# Patient Record
Sex: FEMALE
[De-identification: names, ages, dates, MRNs, and addresses within clinical notes are randomized per-mention and may not be internally consistent; named-entity substitution may affect disease eponyms.]

---

## 2017-12-18 ENCOUNTER — HOSPITAL ENCOUNTER (OUTPATIENT)
Dept: HOSPITAL 80 - FSGY | Age: 27
Discharge: HOME | End: 2017-12-18
Attending: ORTHOPAEDIC SURGERY
Payer: COMMERCIAL

## 2017-12-18 VITALS
OXYGEN SATURATION: 95 % | SYSTOLIC BLOOD PRESSURE: 106 MMHG | RESPIRATION RATE: 16 BRPM | DIASTOLIC BLOOD PRESSURE: 75 MMHG

## 2017-12-18 VITALS — HEART RATE: 80 BPM

## 2017-12-18 VITALS — TEMPERATURE: 97.3 F

## 2017-12-18 DIAGNOSIS — T84.84XA: Primary | ICD-10-CM

## 2017-12-18 PROCEDURE — 0QP304Z REMOVAL OF INTERNAL FIXATION DEVICE FROM LEFT PELVIC BONE, OPEN APPROACH: ICD-10-PCS | Performed by: ORTHOPAEDIC SURGERY

## 2017-12-18 RX ADMIN — LIDOCAINE HYDROCHLORIDE PRN ML: 10 INJECTION, SOLUTION EPIDURAL; INFILTRATION; INTRACAUDAL; PERINEURAL at 07:14

## 2017-12-18 RX ADMIN — LIDOCAINE HYDROCHLORIDE PRN ML: 10 INJECTION, SOLUTION EPIDURAL; INFILTRATION; INTRACAUDAL; PERINEURAL at 06:26

## 2017-12-18 NOTE — PDGENHP
History and Physical





- Chief Complaint


LEFT HIP PAIN





- History of Present Illness


HISTORY OF PRESENT ILLNESS:


Afshanis a 27 y.o.~~~active female~who I have had the pleasure to consult on 

today. I have enjoyed meeting her. She~lives in [place], [place]. ~Afshanworks 

at a  as a coordinator. ~She~is ; she~has 3~children. ~Afhsan

enjoys running, walking, hiking and working out but not active now.





Lauren's left~hip pain started in June~2013, with no~recalled trauma or injury, 

and with no~previous complaints. Afshandoes not have~a known history of hip 

dysplasia. 





Presentation today is of anterior, groin left~hip pain. ~The hip does~wake her~

at night and does~click and catch on her. Sitting can be uncomfortable~for her. 

Afshandoes~report suffering from lower back pain episodes and she has 

scoliosis.





Afshanhas~participated in physical therapy for 1.5 years~and has not~tried 

other conservative measures including hip injections . She~has not~received 

sufficient symptomatic improvement.





Afshanhas~utilized medication for pain management, including NSAID and OTC 

acetaminophen. Afshanhas used medication for 3 years.





Afshanhas occasional clicking on the right.





Afshanunderstands that she~has a hip and pelvis problem which should be 

researched and wishes to get a better understanding of her~hip status, followed 

by an establishment of a treatment strategy, hoping sheIvettwould be able to get 

back to her~well being active life.














History:








Past medical history: ~


None which is relevant 





Relevant familial history: None which is relevant 





Past surgical history: 


Bladder Surgery in 2000





Afshandenies problematic issues with general anesthesia in the past.





I have reviewed, verified and agree with the past medical, surgical, family and 

social history.





Current Medications:Ivetthas a current medication list which includes the following 

prescription(s): ibuprofen and norgestimate-ethinyl estradiol.





ALLERGIES:Ivetthas No Known Allergies.














Objective:








Physical Examination:


Afshanis 5~feet 6~inches tall and weighs 155~Lbs. Afshanis AAO x3; she~is well

-nourished, in NAD. Skin is warm and dry. ~Breathing is non-labored. ~CV with 

RRR by pulse. Abdomen is soft, NTND. 


Currently, she~walks with a normal~gait.


Trendelenburg sign is negative~and proprioception is reduced, both~sides.


She~presents with moderate~signs of joint laxity. Beightons Score: 5


She~is fit looking. ~~


Lower spine examination is negative~for sciatic or femoral nerve irritation 

with negative~SLR &~femoral stretch tests. Range of motion of the spine is 

normal~for flexion, extension, and rotations, with no~associated pain. 


Strength, Sensation and pulses are normal - bilaterally


Ankles and knees exams are normal~and no~mal-alignment is evident. 


She~has no leg length discrepancy.


Thigh circumference is symmetric~with no evidence for muscle atrophy~on both~

sides.





Hip ROM (degrees):











 FL ER


 At 90~hip FL IR


 At 90~hip FL AB AD EX IR


 Neutral hip ER


 Neutral hip


 


R 115 75 35 40 15 10 55 50


 


L 110P 45 50 45 5 10 70 30 (Pain inhibition)








Specific hip and pelvis tests:











 Quadrant CARLTON Roll Add. Longus


 


R + + Negative Negative


 


L +++ +++ Negative +++














 Glut. Med ITB Pos. Imp


 


R Negative


 5/5 strength Negative


 5/5 strength Negative


 


L Negative


 5/5 strength Negative


 5/5 strength Negative








Squeeze test measured normal


Bony Symphysis pubis is pain free~to touch while concentric activity of the 

rectus abdominis, does not~produce pain at its insertion.


Ilio Psos specific tests are negative for pain during cycling for both hips~and 

remarkable for no snap.


HF has good strength, non-painful both hips.


Anterior~capsule tenderness on the left.


Greater trochanteric burse is pain free~on both hips.


Piriformis tests: FAIR is negative, with no~local signs of neuritis related to 

sciatic nerve.


SIJs examination is produces pain on left side~with normal~CARLTON in relation 

and local tenderness.


Hamstrings tests are negative~functional contraction and positive~tendinopathy 

the left hip~at insertion.





On a daily basis, the following percentages reflect Lauren's overall total pain:


Deep hip: 90%


Left SIJ: 10%





Imaging:


Radiology studies which I have personally reviewed, analyzed and measured are 

below:





XR: 


AP of the hip and pelvis: 


Performed in a good~technique 


Coccyx to pubic symphysis distance 3.3~cm.


Upright. Zero degrees.





Shenton Lines are interrupted on the left.~~~~~~~~~~~~~


No~Pathological signs are seen in the Symphysis Pubis. 


No~Pathological signs are seen at the Ischial tuberosity. ~





Specific measurements show: 














 NSA~ LCE Sourcil~Angle  Sharp's angle Lat.


 Cam Lat. Pincer C.Over~sign Head~Coverage % ATDmm


 


R 141 10 16 47 + - - 51 N


 


L 134 -8 27 56 + - - 46 N




















 Pos. wall sign ISS NAD ~~Dysplasia Comments


 


R Negative Negative 26.2~mm +++ 


 


L Negative Negative 25.7~mm +++ 


 


 Sclerosis Sup. Lat. OA Cysts Joint Space-WBZ Joint Space-Medial


 


R + Negative Negative 7.5~mm 5.4~mm


 


L + Negative Negative 7.5~mm 7.9~mm











X Table lateral: 


Anterior cam lesion is seen~on both hips.





Alpha Angle: ~


Right 71~degrees


Left 67~degrees





CT MEASUREMENTS:


Right hip:


Lateral center edge angle: 11 degrees


Equatorial acetabular version angle: 29 degrees anteverted.


Cranial acetabular version angle: 19 degrees anteverted.


Femoral neck shaft angle: 140 degrees.


Right femoral torsion measures 19 degrees.





Left hip:


Lateral center edge angle: 5 degrees


Equatorial acetabular version angle: 27 degrees anteverted.


Cranial acetabular version angle: 13 degrees anteverted.


Femoral neck shaft angle: 141 degrees


Left femoral torsion measures 48 degrees.








Impression and plan: 


Afshanis a 27 y.o.~active female~suffering from symptomatic left~hip pain due 

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 

impingement (BARBARA) Cam type,~causing significant disability to her~and altering 

her~sport and life activities.





She has similar radiographic features on the right side.





Physical examination, imaging, and her~story correspond with the diagnosis 

mentioned above.





I explained that hip dysplasia is a condition wherein the hip joint has 

excessive play~and instability due to a variety of factors, including the 

depth and adequacy of the socket, the orientation of the femur bone, and 

ligament laxity around the hip joint. Dysplasia ranges in severity from 

borderline to nolvia, with treatment options being specific to the specific 

nature of the problem. Left untreated, the instability in the hip joint can 

cause progressive tearing of the labrum and deterioration of the surface 

cartilage, ultimately resulting in progressive osteoarthritis of the hip. 





I explained that femoroacetabular impingement (BARBARA - Cam type) arises due to a 

bony or soft tissue conflict between the femur (ball) and acetabulum (socket) 

caused by an abnormality in the shape of the femoral head and neck. Over time, 

repetitive impingement can result in damage to the labrum and adjacent surface 

cartilage within the socket, ultimately giving rise to progressive 

osteoarthritis of the hip.





I explained that although a labral tear can be a source of pain, it is rarely 

the root of the problem and typically occurs secondary to an underlying 

abnormality in the shape and mechanics of the hip joint. 





I reviewed conservative treatment options for Dysplasia and BARBARA including 

activity modification to avoid positions of impingement or instability, 

physical therapy, non-steroidal anti-inflammatory medications, and various 

injections (corticosteroid and PRP) aimed at reducing inflammation in the hip 

joint or/and preventing dynamic instability and impingement. PRP injections may 

promote healing and reduce symptoms in certain cases but it will not repair 

chronically damaged tissue. Although these measures may help to buy time~and 

reduce current level of symptoms, they are not a definitive solution to the 

problem given the underlying abnormality in the shape of the hip joint.





Patients who have failed conservative management and continue to experience 

symptoms are candidates for definitive surgical treatment, which may consist of 

hip arthroscopy alone or in combination with more invasive bony realignment 

procedures of the hip socket and/or femur called periacetabular osteotomy (JANINE) 

or derotational femoral osteotomy (DFO). 





Hip arthroscopy typically includes treating the labrum with either repair or 

reconstruction of the torn labrum; as well as addressing the underlying 

abnormalities by restoring the normal shape to the hip joint. If the cartilage 

is damaged a Microfracture surgical procedure may also be necessary to help 

stimulate the growth of fibrocartilage. If a patient requires a labral 

reconstruction or a Microfracture, the initial rehabilitation from the surgery 

may take longer, but the long term results are typically favorable.





I reviewed the technical aspects of hip arthroscopy including risks, benefits, 

and expected course of recovery. Lauren~understands that hip arthroscopy is a 

minimally invasive outpatient procedure carried out through small incisions on 

the outer aspect of the hip joint. During surgery, the labral tear will be 

identified and either repaired or reconstructed~using bone anchors and suture 

material. Additionally, any excessive bone will be removed with a high-speed 

micky to reshape the hip joint and restore normal anatomy. Risks include 

infection, bleeding, injury to nearby nerves or vessels, stiffness, persistent 

pain, instability, venous thromboembolic disease, and traction related 

complications including temporary foot numbness. Rarely, revision surgery may 

be required to address these problems. Overall recovery takes approximately 4~ 

8~months depending on the extent of damage and degree of repair.


In the event that the labral tissue quality is inadequate for successful repair 

and healing, Lauren~understands that a labral reconstruction will be performed. 

This procedure entails placing a cadaver tissue graft within the hip joint and 

stabilizing it with bone anchors to build a new labrum. The overall recovery 

time for labral reconstruction is similar to that of labral repair, although 

the surgical procedure takes longer to perform.





I reviewed the technical aspects of periacetabular osteotomy (JANINE) including 

risks, benefits, and expected course of recovery. Afshanunderstands that JANINE 

is an inpatient procedure carried out through two medium sized incisions on the 

front and back of the hip joint. The hip socket is cut, realigned, and 

stabilized with 2  3 internal screws. Risks include infection, bleeding, 

injury to nearby nerves or vessels, stiffness, persistent pain, instability, 

failure of bony healing, implant related complications, and venous 

thromboembolic disease. Rarely, revision surgery may be required to address 

these problems. Risks, potential complications, side effects and recovery from 

surgical procedure were discussed in length. We explained how this surgery is 

an open procedure, and though patients tend to do well in the long-term, it 

involves significant pain in the first 2-4 weeks post-op and a rather lengthy 

rehab.~Overall recovery takes approximately 6  12~months depending on the 

extent of damage and degree of repair. Afshanunderstands that she~will undergo 

hip arthroscopy 1 week prior to the JANINE to address damage inside the hip joint.





Lauren~understands that hip arthroscopy and JANINE are two separate procedures 

that are best performed one week apart, with the arthroscopy commencing first 

to "tighten up" any pathology evident in the hip joint (labral repair, etc.) 

and the JANINE open procedure occurring 7-10 days later to realign the acetabulum.





Lauren~will review the info presented.





In order to obtain more detailed information regarding the alignment, 

orientation, and shape of the bony hip and pelvis I will order a CT scan to be 

performed. The results of the CT scan, including femoral torsion and acetabular 

version measured values and 3D images, will aid me in deciding on the best 

treatment strategy and surgical pre-planning.





In order to evaluate the integrity of the surface cartilage within the hip joint

, I will order a delayed gadolinium enhanced MRI of cartilage (dGEMRIC). This 

study will determine whetherAdrianis a good candidate for hip preservation 

surgery.





Afshanwill talk with our surgical scheduler about possible surgery dates.





Afshanis happy with this plan.





I have also supplied her~with handouts, outlining the expected surgical 

treatment and rehab involved.





I wishAdrianall the best,











~~





History Information





- Allergies/Home Medication List


Allergies/Adverse Reactions: 








No Known Allergies Allergy (Verified 05/11/17 11:09)


 





Home Medications: 








Norgestimate-Ethinyl Estradiol [Sprintec 28 Day Tablet] 1 each PO DAILY 04/28/ 17 [Last Taken 12/17/17]


Ibuprofen [Motrin (*)] 200 mg PO DAILY PRN 11/09/17 [Last Taken Unknown]





I have personally reviewed and updated: medical history





- Social History


Smoking Status: Never smoked





Review of Systems


Review of Systems: 








Physical Exam


Physical Exam: 

















Temp Pulse Resp BP Pulse Ox


 


 36.6 C   71   18   111/71   98 


 


 12/18/17 06:29  12/18/17 06:29  12/18/17 06:29  12/18/17 06:29  12/18/17 06:29

## 2017-12-18 NOTE — POSTANESTH
Post Anesthetic Evaluation


Respiratory Status: Normal, Stable


Level of Consciousness/Mental Status: Can Participate in Eval, Alert and 

Oriented, Unconscious


Pain Control: Adequate, Prn Tx Ordered


Nausea/Vomiting Control: Adequate, Prn Tx Ordered


Complications Possibly Related to Anesthesia: None Noted

## 2017-12-18 NOTE — PDANEPAE
ANE History of Present Illness





Hardware Left Hip





ANE Past Medical History





- Cardiovascular History


Hx Hypertension: No


Hx Arrhythmias: No


Hx Chest Pain: No


Hx Coronary Artery / Peripheral Vascular Disease: No


Hx CHF / Valvular Disease: No


Hx Palpitations: No





- Pulmonary History


Hx COPD: No


Hx Asthma/Reactive Airway Disease: No


Hx Recent Upper Respiratory Infection: No


Hx Oxygen in Use at Home: No


Hx Sleep Apnea: No


Sleep Apnea Screening Result - Last Documented: Negative





- Neurologic History


Hx Cerebrovascular Accident: No


Hx Seizures: No


Hx Dementia: No





- Endocrine History


Hx Diabetes: No





- Renal History


Hx Renal Disorders: Yes


Renal History Comment: hx of chronic uti's.  bladder surgery at 11 yo





- Liver History


Hx Hepatic Disorders: No





- Neurological & Psychiatric Hx


Hx Neurological and Psychiatric Disorders: No





- Cancer History


Hx Cancer: No





- Congenital Disorder History


Hx Congenital Disorders: Yes


Congenital History Comment: bilater hip issues





- GI History


Hx Gastrointestinal Disorders: No





- Other Health History


Other Health History: anemic.  wears glasses





- Chronic Pain History


Chronic Pain: Yes (bilateral hips)





- Surgical History


Prior Surgeries: 06/19/17 left hip scope with Cris-[place].  Bladder surgery @ 11 yo





ANE Review of Systems


Review of Systems: 








- Exercise capacity


METS (RN): 5 METS





ANE Patient History





- Allergies


Allergies/Adverse Reactions: 








No Known Allergies Allergy (Verified 05/11/17 11:09)


 








- Home Medications


Home Medications: 








Norgestimate-Ethinyl Estradiol [Sprintec 28 Day Tablet] 1 each PO DAILY 04/28/ 17 [Last Taken 12/17/17]


Ibuprofen [Motrin (*)] 200 mg PO DAILY PRN 11/09/17 [Last Taken Unknown]








- NPO status


NPO Since - Liquids (Date): 12/18/17


NPO Since - Liquids (Time): 03:30


NPO Since - Solids (Date): 12/17/17


NPO Since - Solids (Time): 19:00





- Smoking Hx


Smoking Status: Never smoked





- Family Anes Hx


Family Hx Anesthesia Complications: none





ANE Labs/Vital Signs





- Vital Signs


Blood Pressure: 111/71


Heart Rate: 71


Respiratory Rate: 18


O2 Sat (%): 98


Height: 167.64 cm


Weight: 68.039 kg





ANE Anesthesia Plan


Anesthesia Plan: MAC

## 2018-10-28 NOTE — PDGENHP
History and Physical





- Chief Complaint


RIGHT HIP PAIN/ LEFT HIP PAIN





- History of Present Illness


Diagnosis:





1.~Bilateral~Hip Dyplasia, 


2. Bilateral Femoroacetabular impingement (BARBARA) Cam type,~


3. Left excessive femoral ante-torsion (48 degrees)


4. History of LEFT Hip Scope/DFO





HISTORY OF PRESENT ILLNESS:


Afshanis a 28 y.o.~~~active female~who I have had the pleasure to consult on 

today. I have enjoyed meeting her.~Lupe~lives in [place], [place].~~Afshanworks 

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 ~Afshan

enjoys running, walking, hiking and working out but not active now.





Gregs~hip pain started in~June~2013, with~no~recalled trauma or injury, and 

with no~previous complaints. Afshandoes not have~a known history of hip 

dysplasia. 





Presentation today is of~anterior,~groin~bilateral~hip pain. ~The hip~does~wake 

her~at night and does~click and catch on her. Sitting~can be uncomfortable~for 

her.~Afshandoes~report suffering from lower back pain episodes and she has 

scoliosis.





Afshanhas~participated in physical therapy for 1.5 years~and has not~tried 

other conservative measures including hip injections~.~Lupe~has not~received 

sufficient symptomatic improvement.





Afshanhas~utilized medication for pain management, including NSAID and OTC 

acetaminophen.~Afshanhas used medication for 3~years.





Afshanhas occasional clicking on the right.





Afshanunderstands that she~has a hip and pelvis problem which should be 

researched and wishes to get a better understanding of her~hip status, followed 

by an establishment of a treatment strategy, hoping she~would be able to get 

back to her~well being active life.














History:








Past medical history:~~


None which is relevant~





Relevant familial history:~None which is relevant~





Past surgical history:~


Bladder Surgery in 2000





Afshandenies problematic issues with general anesthesia in the past.





I have reviewed, verified and agree with the past medical, surgical, family and 

social history.





Current Medications:~has a current medication list which includes the following 

prescription(s): ibuprofen and norgestimate-ethinyl estradiol.





ALLERGIES:~has No Known Allergies.














Objective:








Physical Examination:


Afshanis 5~feet 6~inches tall and weighs 155~Lbs. Afshanis AAO x3; she~is well

-nourished, in NAD. Skin is warm and dry. ~Breathing is non-labored. ~CV with 

RRR by pulse. Abdomen is soft, NTND. 


Currently,~she~walks with a normal~gait.


Trendelenburg sign is~negative~and proprioception is reduced,~both~sides.


She~presents with moderate~signs of joint laxity. Beightons Score:~5


She~is fit looking. ~~


Lower spine examination is~negative~for sciatic or femoral nerve irritation 

with negative~SLR &~femoral stretch tests. Range of motion of the spine is 

normal~for flexion, extension, and rotations, with no~associated pain. 


Strength, Sensation and pulses are~normal -~bilaterally


Ankles and knees exams are~normal~and no~mal-alignment is evident. 


She~has no leg length discrepancy.


Thigh circumference is~symmetric~with no evidence for muscle atrophy~on both~

sides.





Hip ROM (degrees):











 FL ER


 At 90~hip FL IR


 At 90~hip FL AB AD EX IR


 Neutral hip ER


 Neutral hip


 


R 115 75 35 40 15 10 55 50


 


L 110P 45 50 45 5 10 70 30~(Pain inhibition)








Specific hip and pelvis tests:











 Quadrant CARLTON Roll Add. Longus


 


R + + Negative Negative


 


L +++ +++ Negative +++














 Glut. Med ITB Pos. Imp


 


R Negative


 5/5 strength Negative


 5/5 strength Negative


 


L Negative


 5/5 strength Negative


 5/5 strength Negative








Squeeze test measured~normal


Bony Symphysis pubis is~pain free~to touch while concentric activity of the 

rectus abdominis, does not~produce pain at its insertion.


Ilio Psos specific tests are~negative for pain during cycling for~both hips~and 

remarkable for no snap.


HF has~good strength, non-painful~both hips.


Anterior~capsule tenderness on the left.


Greater trochanteric burse is~pain free~on both hips.


Piriformis tests: FAIR is~negative,~with no~local signs of neuritis related to 

sciatic nerve.


SIJs examination is~produces pain on~left side~with normal~CARLTON in relation 

and local tenderness.


Hamstrings tests are~negative~functional contraction and positive~tendinopathy 

the left hip~at insertion.





On a daily basis, the following percentages reflectCheri's overall total pain:


Deep hip:~90%


Left SIJ:~10%





Imaging:


Radiology studies which I have personally reviewed, analyzed and measured are 

below:





XR: 


AP of the hip and pelvis: 


Performed in a~good~technique 


Coccyx to pubic symphysis distance~3.3~cm.


Upright. Zero degrees.





Shenton Lines are~interrupted~on the left.~~~~~~~~~~~~~


No~Pathological signs are seen in the Symphysis Pubis. 


No~Pathological signs are seen at the Ischial tuberosity. ~





Specific measurements show: 














 NSA~ LCE Sourcil~Angle  Sharp's angle Lat.


 Cam Lat. Pincer C.Over~sign Head~Coverage % ATDmm


 


R 141 10 16 47 + - - 51 N


 


L 134 -8 27 56 + - - 46 N




















 Pos. wall sign ISS NAD ~~Dysplasia Comments


 


R Negative Negative 26.2~mm +++ 


 


L Negative Negative 25.7~mm +++ 


 


 Sclerosis Sup. Lat. OA Cysts Joint Space-WBZ Joint Space-Medial


 


R + Negative Negative 7.5~mm 5.4~mm


 


L + Negative Negative 7.5~mm 7.9~mm











X Table lateral: 


Anterior cam lesion is~seen~on both hips.





Alpha Angle: ~


Right~71~degrees


Left~67~degrees





CT~MEASUREMENTS:


Right hip:


Lateral center edge angle: 11 degrees


Equatorial acetabular version angle: 29 degrees anteverted.


Cranial acetabular version angle: 19 degrees anteverted.


Femoral neck shaft angle: 140 degrees.


Right femoral torsion measures 19 degrees.





Left hip:


Lateral center edge angle: 5 degrees


Equatorial acetabular version angle: 27 degrees anteverted.


Cranial acetabular version angle: 13 degrees anteverted.


Femoral neck shaft angle: 141 degrees


Left femoral torsion measures 48 degrees.








Impression and plan:Ivett Cavanaughis a 28 y.o.~active female~suffering from symptomatic bilateral~hip pain 

due to Hip Dyplasia~with symptomatic~labral tear and~Femoroacetabular 

impingement (BARBARA) Cam type,~causing significant disability to her~and altering 

her~sport and life activities.





She has similar radiographic features on the right side.





Physical examination, imaging, and~her~story correspond with the diagnosis 

mentioned above.





I explained that hip dysplasia is a condition wherein the hip joint has 

excessive play~and instability due to a variety of factors, including the 

depth and adequacy of the socket, the orientation of the femur bone, and 

ligament laxity around the hip joint. Dysplasia ranges in severity from 

borderline to nolvia, with treatment options being specific to the specific 

nature of the problem. Left untreated, the instability in the hip joint can 

cause progressive tearing of the labrum and deterioration of the surface 

cartilage, ultimately resulting in progressive osteoarthritis of the hip. 





I explained that femoroacetabular impingement (BARBARA - Cam type) arises due to a 

bony or soft tissue conflict between the femur (ball) and acetabulum (socket) 

caused by an abnormality in the shape of the femoral head and neck. Over time, 

repetitive impingement can result in damage to the labrum and adjacent surface 

cartilage within the socket, ultimately giving rise to progressive 

osteoarthritis of the hip.





I explained that although a labral tear can be a source of pain, it is rarely 

the root of the problem and typically occurs secondary to an underlying 

abnormality in the shape and mechanics of the hip joint. 





I reviewed conservative treatment options for Dysplasia and BARBARA including 

activity modification to avoid positions of impingement or instability, 

physical therapy, non-steroidal anti-inflammatory medications, and various 

injections (corticosteroid and PRP) aimed at reducing inflammation in the hip 

joint or/and preventing dynamic instability and impingement. PRP injections may 

promote healing and reduce symptoms in certain cases but it will not repair 

chronically damaged tissue. Although these measures may help to buy time~and 

reduce current level of symptoms, they are not a definitive solution to the 

problem given the underlying abnormality in the shape of the hip joint.





Patients who have failed conservative management and continue to experience 

symptoms are candidates for definitive surgical treatment, which may consist of 

hip arthroscopy alone or in combination with more invasive bony realignment 

procedures of the hip socket and/or femur called periacetabular osteotomy (JANINE) 

or derotational femoral osteotomy (DFO). 





Hip arthroscopy typically includes treating the labrum with either repair or 

reconstruction of the torn labrum; as well as addressing the underlying 

abnormalities by restoring the normal shape to the hip joint. If the cartilage 

is damaged a Microfracture surgical procedure may also be necessary to help 

stimulate the growth of fibrocartilage. If a patient requires a labral 

reconstruction or a Microfracture, the initial rehabilitation from the surgery 

may take longer, but the long term results are typically favorable.





I reviewed the technical aspects of hip arthroscopy including risks, benefits, 

and expected course of recovery.~Lauren~understands that hip arthroscopy is a 

minimally invasive outpatient procedure carried out through small incisions on 

the outer aspect of the hip joint. During surgery, the labral tear will be 

identified and either repaired or reconstructed~using bone anchors and suture 

material. Additionally, any excessive bone will be removed with a high-speed 

micky to reshape the hip joint and restore normal anatomy. Risks include 

infection, bleeding, injury to nearby nerves or vessels, stiffness, persistent 

pain, instability, venous thromboembolic disease, and traction related 

complications including temporary foot numbness. Rarely, revision surgery may 

be required to address these problems. Overall recovery takes approximately 4~ 

8~months depending on the extent of damage and degree of repair.


In the event that the labral tissue quality is inadequate for successful repair 

and healing,~Lauren~understands that a labral reconstruction will be performed. 

This procedure entails placing a cadaver tissue graft within the hip joint and 

stabilizing it with bone anchors to build a new labrum. The overall recovery 

time for labral reconstruction is similar to that of labral repair, although 

the surgical procedure takes longer to perform.





I reviewed the technical aspects of periacetabular osteotomy (JANINE) including 

risks, benefits, and expected course of recovery.~Lauren~understands that JANINE 

is an inpatient procedure carried out through two medium sized incisions on the 

front and back of the hip joint. The hip socket is cut, realigned, and 

stabilized with 2  3 internal screws. Risks include infection, bleeding, 

injury to nearby nerves or vessels, stiffness, persistent pain, instability, 

failure of bony healing, implant related complications, and venous 

thromboembolic disease. Rarely, revision surgery may be required to address 

these problems. Risks, potential complications, side effects and recovery from 

surgical procedure were discussed in length. We explained how this surgery is 

an open procedure, and though patients tend to do well in the long-term, it 

involves significant pain in the first 2-4 weeks post-op and a rather lengthy 

rehab.~Overall recovery takes approximately 6  12~months depending on the 

extent of damage and degree of repair. Afshanunderstands that she~will undergo 

hip arthroscopy 1 week prior to the JANINE to address damage inside the hip joint.





Afshanunderstands that hip arthroscopy and JANINE are two separate procedures 

that are best performed one week apart, with the arthroscopy commencing first 

to "tighten up" any pathology evident in the hip joint (labral repair, etc.) 

and the JANINE open procedure occurring 7-10 days later to realign the acetabulum.





Afshanwill review the info presented.





In order to obtain more detailed information regarding the alignment, 

orientation, and shape of the bony hip and pelvis I will order a CT scan to be 

performed. The results of the CT scan, including femoral torsion and acetabular 

version measured values and 3D images, will aid me in deciding on the best 

treatment strategy and surgical pre-planning.





In order to evaluate the integrity of the surface cartilage within the hip joint

, I will order a delayed gadolinium enhanced MRI of cartilage (dGEMRIC). This 

study will determine whetherAdrianis a good candidate for hip preservation 

surgery.





Afshanwill talk with our surgical scheduler about possible surgery dates.





Afshanis happy with this plan.





I have also supplied~her~with handouts, outlining the expected surgical 

treatment and rehab involved.





I wishAdrianall the best,











~~


Linus Bradley MD





History Information





- Allergies/Home Medication List


Allergies/Adverse Reactions: 








No Known Allergies Allergy (Verified 05/11/17 11:09)


 





Home Medications: 








Norgestimate-Ethinyl Estradiol [Sprintec 28 Day Tablet] 1 each PO HS 04/28/17 [

Last Taken 12/17/17]


Iron HS 10/16/18 [Last Taken Unknown]





I have personally reviewed and updated: medical history





- Social History


Smoking Status: Never smoked





Review of Systems


Review of Systems: 








Physical Exam


Physical Exam:

## 2018-10-29 ENCOUNTER — HOSPITAL ENCOUNTER (OUTPATIENT)
Dept: HOSPITAL 80 - FSGY | Age: 28
Discharge: HOME | End: 2018-10-29
Attending: ORTHOPAEDIC SURGERY
Payer: COMMERCIAL

## 2018-10-29 VITALS — DIASTOLIC BLOOD PRESSURE: 88 MMHG | SYSTOLIC BLOOD PRESSURE: 114 MMHG

## 2018-10-29 DIAGNOSIS — M25.552: ICD-10-CM

## 2018-10-29 DIAGNOSIS — T84.84XA: ICD-10-CM

## 2018-10-29 DIAGNOSIS — Q65.89: ICD-10-CM

## 2018-10-29 DIAGNOSIS — M65.88: ICD-10-CM

## 2018-10-29 DIAGNOSIS — M25.852: Primary | ICD-10-CM

## 2018-10-29 DIAGNOSIS — M41.9: ICD-10-CM

## 2018-10-29 PROCEDURE — 0SQB4ZZ REPAIR LEFT HIP JOINT, PERCUTANEOUS ENDOSCOPIC APPROACH: ICD-10-PCS | Performed by: ORTHOPAEDIC SURGERY

## 2018-10-29 PROCEDURE — 0QQ54ZZ REPAIR LEFT ACETABULUM, PERCUTANEOUS ENDOSCOPIC APPROACH: ICD-10-PCS | Performed by: ORTHOPAEDIC SURGERY

## 2018-10-29 PROCEDURE — 0SBB4ZZ EXCISION OF LEFT HIP JOINT, PERCUTANEOUS ENDOSCOPIC APPROACH: ICD-10-PCS | Performed by: ORTHOPAEDIC SURGERY

## 2018-10-29 PROCEDURE — BQ11YZZ FLUOROSCOPY OF LEFT HIP USING OTHER CONTRAST: ICD-10-PCS | Performed by: ORTHOPAEDIC SURGERY

## 2018-10-29 PROCEDURE — 0SPB04Z REMOVAL OF INTERNAL FIXATION DEVICE FROM LEFT HIP JOINT, OPEN APPROACH: ICD-10-PCS | Performed by: ORTHOPAEDIC SURGERY

## 2018-10-29 PROCEDURE — C1713 ANCHOR/SCREW BN/BN,TIS/BN: HCPCS

## 2018-10-29 NOTE — PDANEPAE
ANE Past Medical History





- Cardiovascular History


Hx Hypertension: No


Hx Arrhythmias: No


Hx Chest Pain: No


Hx Coronary Artery / Peripheral Vascular Disease: No


Hx CHF / Valvular Disease: No


Hx Palpitations: No





- Pulmonary History


Hx COPD: No


Hx Asthma/Reactive Airway Disease: No


Hx Recent Upper Respiratory Infection: No


Hx Oxygen in Use at Home: No


Hx Sleep Apnea: No


Sleep Apnea Screening Result - Last Documented: Negative


Pulmonary History Comment: Pt with recent h/o bronchitis last week 2/2 viral 

illness.  Completing Z-pack today.  Pt symptoms almost resolved.





- Neurologic History


Hx Cerebrovascular Accident: No


Hx Seizures: No


Hx Dementia: No





- Endocrine History


Hx Diabetes: No


Obesity: no





- Renal History


Hx Renal Disorders: Yes


Renal History Comment: hx of chronic uti's.  bladder surgery at AGE 10





- Liver History


Hx Hepatic Disorders: No





- Neurological & Psychiatric Hx


Hx Neurological and Psychiatric Disorders: No





- Cancer History


Hx Cancer: No





- Congenital Disorder History


Hx Congenital Disorders: Yes


Congenital History Comment: bilater hipS





- GI History


Hx Gastrointestinal Disorders: No





- Other Health History


Other Health History: anemic.  wears glasses





- Chronic Pain History


Chronic Pain: Yes (RT HIP)





- Surgical History


Prior Surgeries: LT PERIACETABULAR OSTEOTOMY 6/2017.  left hip scope 6/2017.  

Bladder surgery @ 9 yo





ANE Review of Systems


Review of Systems: 








- Exercise capacity


METS (RN): 4 METS





ANE Patient History





- Allergies


Allergies/Adverse Reactions: 








No Known Allergies Allergy (Verified 05/11/17 11:09)


 








- Home Medications


Home Medications: 








Norgestimate-Ethinyl Estradiol [Sprintec 28 Day Tablet] 1 each PO HS 04/28/17 [

Last Taken 10/28/18]


Iron HS 10/16/18 [Last Taken 10/23/18]


Zithromax  10/29/18 [Last Taken 10/28/18 12:00]








- NPO status


NPO Since - Liquids (Date): 10/29/18


NPO Since - Liquids (Time): 09:15


NPO Since - Solids (Date): 10/28/18


NPO Since - Solids (Time): 19:00





- Smoking Hx


Smoking Status: Never smoked





- Family Anes Hx


Family Hx Anesthesia Complications: none





ANE Labs/Vital Signs





- Vital Signs


Blood Pressure: 115/85


Heart Rate: 84


Respiratory Rate: 16


O2 Sat (%): 96


Height: 167.64 cm


Weight: 66.678 kg





ANE Physical Exam





- Airway


Neck exam: FROM


Mallampati Score: Class 2


Mouth exam: normal dental/mouth exam, small mouth opening, abnormal chin (<2 fb 

TM distance)





- Pulmonary


Pulmonary: no respiratory distress, clear to auscultation





- Cardiovascular


Cardiovascular: regular rate and rhythym





- ASA Status


ASA Status: II





ANE Anesthesia Plan


Anesthesia Plan: general endotracheal anesthesia

## 2018-11-04 NOTE — PDGENHP
History and Physical





- Chief Complaint


RIGHT HIP PAIN





- History of Present Illness











Diagnosis:





1.~Bilateral~Hip Dyplasia, RIGHT side symptomatic


2. Right~Femoroacetabular impingement (BARBARA) Cam type,~








HISTORY OF PRESENT ILLNESS:


Afshanis a 28 y.o.~~~active female~who I have had the pleasure to consult on 

today. I have enjoyed meeting her.~Lupe~lives in [place], [place].~~Afshanworks 

at a  as a coordinator.~~She~is ;~she~has 3~children. ~Afshan

enjoys running, walking, hiking and working out but not active now.





Lauren's~Right~hip pain started in~June~2013, with~no~recalled trauma or injury

, and with no~previous complaints. Afshandoes not have~a known history of hip 

dysplasia. 





Presentation today is of~anterior,~groin~Right~hip pain. ~The hip~does~wake her~

at night and does~click and catch on her. Sitting~can be uncomfortable~for her.~

Afshandoes~report suffering from lower back pain episodes and she has 

scoliosis.





Afshanhas~participated in physical therapy for 1.5 years~and has not~tried 

other conservative measures including hip injections~.~Lupe~has not~received 

sufficient symptomatic improvement.





Afshanhas~utilized medication for pain management, including NSAID and OTC 

acetaminophen.~Afshanhas used medication for 3~years.





Afshanhas occasional clicking on the right.





Afshanunderstands that she~has a hip and pelvis problem which should be 

researched and wishes to get a better understanding of her~hip status, followed 

by an establishment of a treatment strategy, hoping she~would be able to get 

back to her~well being active life.














History:








Past medical history:~~


None which is relevant~





Relevant familial history:~None which is relevant~





Past surgical history:~


Bladder Surgery in 2000


LEFT JANINE


LEFT HIP SCOPE/DFO





Afshandenies problematic issues with general anesthesia in the past.





I have reviewed, verified and agree with the past medical, surgical, family and 

social history.





Current Medications:~has a current medication list which includes the following 

prescription(s): ibuprofen and norgestimate-ethinyl estradiol.





ALLERGIES:~has No Known Allergies.














Objective:








Physical Examination:


Afshanis 5~feet 6~inches tall and weighs 155~Lbs. Afshanis AAO x3; she~is well

-nourished, in NAD. Skin is warm and dry. ~Breathing is non-labored. ~CV with 

RRR by pulse. Abdomen is soft, NTND. 


Currently,~she~walks with a normal~gait.


Trendelenburg sign is~negative~and proprioception is reduced,~both~sides.


She~presents with moderate~signs of joint laxity. Beightons Score:~5


She~is fit looking. ~~


Lower spine examination is~negative~for sciatic or femoral nerve irritation 

with negative~SLR &~femoral stretch tests. Range of motion of the spine is 

normal~for flexion, extension, and rotations, with no~associated pain. 


Strength, Sensation and pulses are~normal -~bilaterally


Ankles and knees exams are~normal~and no~mal-alignment is evident. 


She~has no leg length discrepancy.


Thigh circumference is~symmetric~with no evidence for muscle atrophy~on both~

sides.





Hip ROM (degrees):











 FL ER


 At 90~hip FL IR


 At 90~hip FL AB AD EX IR


 Neutral hip ER


 Neutral hip


 


R 115 75 35 40 15 10 55 50


 


L 110P 45 50 45 5 10 70 30~(Pain inhibition)








Specific hip and pelvis tests:











 Quadrant CARLTON Roll Add. Longus


 


R + + Negative Negative


 


L +++ +++ Negative +++














 Glut. Med ITB Pos. Imp


 


R Negative


 5/5 strength Negative


 5/5 strength Negative


 


L Negative


 5/5 strength Negative


 5/5 strength Negative








Squeeze test measured~normal


Bony Symphysis pubis is~pain free~to touch while concentric activity of the 

rectus abdominis, does not~produce pain at its insertion.


Ilio Psos specific tests are~negative for pain during cycling for~both hips~and 

remarkable for no snap.


HF has~good strength, non-painful~both hips.


Anterior~capsule tenderness on the left.


Greater trochanteric burse is~pain free~on both hips.


Piriformis tests: FAIR is~negative,~with no~local signs of neuritis related to 

sciatic nerve.


SIJs examination is~produces pain on~left side~with normal~CARLTON in relation 

and local tenderness.


Hamstrings tests are~negative~functional contraction and positive~tendinopathy 

the left hip~at insertion.





On a daily basis, the following percentages reflectCheri's overall total pain:


Deep hip:~90%


Left SIJ:~10%





Imaging:


Radiology studies which I have personally reviewed, analyzed and measured are 

below:





XR: 


AP of the hip and pelvis: 


Performed in a~good~technique 


Coccyx to pubic symphysis distance~3.3~cm.


Upright. Zero degrees.





Shenton Lines are~interrupted~on the left.~~~~~~~~~~~~~


No~Pathological signs are seen in the Symphysis Pubis. 


No~Pathological signs are seen at the Ischial tuberosity. ~





Specific measurements show: 














 NSA~ LCE Sourcil~Angle  Sharp's angle Lat.


 Cam Lat. Pincer C.Over~sign Head~Coverage % ATDmm


 


R 141 10 16 47 + - - 51 N


 


L 134 -8 27 56 + - - 46 N




















 Pos. wall sign ISS NAD ~~Dysplasia Comments


 


R Negative Negative 26.2~mm +++ 


 


L Negative Negative 25.7~mm +++ 


 


 Sclerosis Sup. Lat. OA Cysts Joint Space-WBZ Joint Space-Medial


 


R + Negative Negative 7.5~mm 5.4~mm


 


L + Negative Negative 7.5~mm 7.9~mm











X Table lateral: 


Anterior cam lesion is~seen~on both hips.





Alpha Angle: ~


Right~71~degrees


Left~67~degrees





CT~MEASUREMENTS:


Right hip:


Lateral center edge angle: 11 degrees


Equatorial acetabular version angle: 29 degrees anteverted.


Cranial acetabular version angle: 19 degrees anteverted.


Femoral neck shaft angle: 140 degrees.


Right femoral torsion measures 19 degrees.





Left hip:


Lateral center edge angle: 5 degrees


Equatorial acetabular version angle: 27 degrees anteverted.


Cranial acetabular version angle: 13 degrees anteverted.


Femoral neck shaft angle: 141 degrees


Left femoral torsion measures 48 degrees.








Impression and plan:Ivett Cavanaughis a 28 y.o.~active female~suffering from symptomatic left~hip pain due 

to Left~Hip Dyplasia~with symptomatic~labral tear and~Femoroacetabular 

impingement (BARBARA) Cam type,~causing significant disability to her~and altering 

her~sport and life activities.





She has similar radiographic features on the right side.





Physical examination, imaging, and~her~story correspond with the diagnosis 

mentioned above.





I explained that hip dysplasia is a condition wherein the hip joint has 

excessive play~and instability due to a variety of factors, including the 

depth and adequacy of the socket, the orientation of the femur bone, and 

ligament laxity around the hip joint. Dysplasia ranges in severity from 

borderline to nolvia, with treatment options being specific to the specific 

nature of the problem. Left untreated, the instability in the hip joint can 

cause progressive tearing of the labrum and deterioration of the surface 

cartilage, ultimately resulting in progressive osteoarthritis of the hip. 





I explained that femoroacetabular impingement (BARBARA - Cam type) arises due to a 

bony or soft tissue conflict between the femur (ball) and acetabulum (socket) 

caused by an abnormality in the shape of the femoral head and neck. Over time, 

repetitive impingement can result in damage to the labrum and adjacent surface 

cartilage within the socket, ultimately giving rise to progressive 

osteoarthritis of the hip.





I explained that although a labral tear can be a source of pain, it is rarely 

the root of the problem and typically occurs secondary to an underlying 

abnormality in the shape and mechanics of the hip joint. 





I reviewed conservative treatment options for Dysplasia and BARBARA including 

activity modification to avoid positions of impingement or instability, 

physical therapy, non-steroidal anti-inflammatory medications, and various 

injections (corticosteroid and PRP) aimed at reducing inflammation in the hip 

joint or/and preventing dynamic instability and impingement. PRP injections may 

promote healing and reduce symptoms in certain cases but it will not repair 

chronically damaged tissue. Although these measures may help to buy time~and 

reduce current level of symptoms, they are not a definitive solution to the 

problem given the underlying abnormality in the shape of the hip joint.





Patients who have failed conservative management and continue to experience 

symptoms are candidates for definitive surgical treatment, which may consist of 

hip arthroscopy alone or in combination with more invasive bony realignment 

procedures of the hip socket and/or femur called periacetabular osteotomy (JANINE) 

or derotational femoral osteotomy (DFO). 





Hip arthroscopy typically includes treating the labrum with either repair or 

reconstruction of the torn labrum; as well as addressing the underlying 

abnormalities by restoring the normal shape to the hip joint. If the cartilage 

is damaged a Microfracture surgical procedure may also be necessary to help 

stimulate the growth of fibrocartilage. If a patient requires a labral 

reconstruction or a Microfracture, the initial rehabilitation from the surgery 

may take longer, but the long term results are typically favorable.





I reviewed the technical aspects of hip arthroscopy including risks, benefits, 

and expected course of recovery.~Lauren~understands that hip arthroscopy is a 

minimally invasive outpatient procedure carried out through small incisions on 

the outer aspect of the hip joint. During surgery, the labral tear will be 

identified and either repaired or reconstructed~using bone anchors and suture 

material. Additionally, any excessive bone will be removed with a high-speed 

micky to reshape the hip joint and restore normal anatomy. Risks include 

infection, bleeding, injury to nearby nerves or vessels, stiffness, persistent 

pain, instability, venous thromboembolic disease, and traction related 

complications including temporary foot numbness. Rarely, revision surgery may 

be required to address these problems. Overall recovery takes approximately 4~ 

8~months depending on the extent of damage and degree of repair.


In the event that the labral tissue quality is inadequate for successful repair 

and healing,~Lauren~understands that a labral reconstruction will be performed. 

This procedure entails placing a cadaver tissue graft within the hip joint and 

stabilizing it with bone anchors to build a new labrum. The overall recovery 

time for labral reconstruction is similar to that of labral repair, although 

the surgical procedure takes longer to perform.





I reviewed the technical aspects of periacetabular osteotomy (JANINE) including 

risks, benefits, and expected course of recovery.~Lauren~understands that JANINE 

is an inpatient procedure carried out through two medium sized incisions on the 

front and back of the hip joint. The hip socket is cut, realigned, and 

stabilized with 2  3 internal screws. Risks include infection, bleeding, 

injury to nearby nerves or vessels, stiffness, persistent pain, instability, 

failure of bony healing, implant related complications, and venous 

thromboembolic disease. Rarely, revision surgery may be required to address 

these problems. Risks, potential complications, side effects and recovery from 

surgical procedure were discussed in length. We explained how this surgery is 

an open procedure, and though patients tend to do well in the long-term, it 

involves significant pain in the first 2-4 weeks post-op and a rather lengthy 

rehab.~Overall recovery takes approximately 6  12~months depending on the 

extent of damage and degree of repair. Afshanunderstands that she~will undergo 

hip arthroscopy 1 week prior to the JANINE to address damage inside the hip joint.





Afshanunderstands that hip arthroscopy and JANINE are two separate procedures 

that are best performed one week apart, with the arthroscopy commencing first 

to "tighten up" any pathology evident in the hip joint (labral repair, etc.) 

and the JANINE open procedure occurring 7-10 days later to realign the acetabulum.





Afshanwill review the info presented.





In order to obtain more detailed information regarding the alignment, 

orientation, and shape of the bony hip and pelvis I will order a CT scan to be 

performed. The results of the CT scan, including femoral torsion and acetabular 

version measured values and 3D images, will aid me in deciding on the best 

treatment strategy and surgical pre-planning.





In order to evaluate the integrity of the surface cartilage within the hip joint

, I will order a delayed gadolinium enhanced MRI of cartilage (dGEMRIC). This 

study will determine whetherAdrianis a good candidate for hip preservation 

surgery.





Afshanwill talk with our surgical scheduler about possible surgery dates.





Afshanis happy with this plan.





I have also supplied~her~with handouts, outlining the expected surgical 

treatment and rehab involved.





I wishAdrianall the best,











~~


Linus Bradley MD





History Information





- Allergies/Home Medication List


Allergies/Adverse Reactions: 








No Known Allergies Allergy (Verified 05/11/17 11:09)


 





Home Medications: 








Norgestimate-Ethinyl Estradiol [Sprintec 28 Day Tablet] 1 each PO HS 04/28/17 [

Last Taken 10/28/18]


Naproxen BID 11/02/18 [Last Taken Unknown]





I have personally reviewed and updated: medical history





- Social History


Smoking Status: Never smoked





Review of Systems


Review of Systems: 








Physical Exam


Physical Exam:

## 2018-11-04 NOTE — PDGENHP
History and Physical





- Chief Complaint


hip pain





- History of Present Illness


xoxo





History Information





- Allergies/Home Medication List


Allergies/Adverse Reactions: 








No Known Allergies Allergy (Verified 05/11/17 11:09)


 





Home Medications: 








Norgestimate-Ethinyl Estradiol [Sprintec 28 Day Tablet] 1 each PO HS 04/28/17 [

Last Taken 11/04/18]


Naproxen [Naprosyn] 500 mg PO BIDMEAL 11/02/18 [Last Taken 11/03/18]





I have personally reviewed and updated: medical history





- Social History


Smoking Status: Never smoked





Review of Systems


Review of Systems: 








Physical Exam


Physical Exam: 








Lab Data & Imaging Review





 11/06/18 05:15





 11/06/18 05:15

## 2018-11-05 ENCOUNTER — HOSPITAL ENCOUNTER (INPATIENT)
Dept: HOSPITAL 80 - FSGY | Age: 28
LOS: 2 days | Discharge: HOME | DRG: 517 | End: 2018-11-07
Attending: ORTHOPAEDIC SURGERY | Admitting: ORTHOPAEDIC SURGERY
Payer: COMMERCIAL

## 2018-11-05 DIAGNOSIS — M25.851: ICD-10-CM

## 2018-11-05 DIAGNOSIS — Q65.89: Primary | ICD-10-CM

## 2018-11-05 LAB — PLATELET # BLD: 177 10^3/UL (ref 150–400)

## 2018-11-05 PROCEDURE — 0QS404Z REPOSITION RIGHT ACETABULUM WITH INTERNAL FIXATION DEVICE, OPEN APPROACH: ICD-10-PCS | Performed by: ORTHOPAEDIC SURGERY

## 2018-11-05 PROCEDURE — C1713 ANCHOR/SCREW BN/BN,TIS/BN: HCPCS

## 2018-11-05 RX ADMIN — POLYETHYLENE GLYCOL 3350 PRN GM: 17 POWDER, FOR SOLUTION ORAL at 18:37

## 2018-11-05 RX ADMIN — NAPROXEN SODIUM SCH MG: 220 TABLET, FILM COATED ORAL at 20:51

## 2018-11-05 RX ADMIN — HYDROMORPHONE HYDROCHLORIDE SCH MG: 2 TABLET ORAL at 23:49

## 2018-11-05 RX ADMIN — HYDROMORPHONE HYDROCHLORIDE SCH MG: 2 TABLET ORAL at 16:10

## 2018-11-05 RX ADMIN — NAPROXEN SODIUM SCH MG: 220 TABLET, FILM COATED ORAL at 16:10

## 2018-11-05 RX ADMIN — HYDROMORPHONE HYDROCHLORIDE SCH MG: 2 TABLET ORAL at 20:51

## 2018-11-05 RX ADMIN — HYDROMORPHONE HYDROCHLORIDE SCH MG: 2 TABLET ORAL at 18:32

## 2018-11-05 RX ADMIN — DOCUSATE SODIUM AND SENNOSIDES SCH TAB: 50; 8.6 TABLET ORAL at 20:51

## 2018-11-05 NOTE — PDMN
Medical Necessity


Medical necessity: MCG  GRG musculoskeletal sgy  [facility] INPT only   R JANINE  

periacetabular osteotomy

## 2018-11-05 NOTE — PDANEPAE
ANE Past Medical History





- Cardiovascular History


Hx Arrhythmias: No


Hx Chest Pain: No


Hx Coronary Artery / Peripheral Vascular Disease: No


Hx CHF / Valvular Disease: No


Hx Palpitations: No





- Pulmonary History


Hx COPD: No


Hx Asthma/Reactive Airway Disease: No


Hx Recent Upper Respiratory Infection: No


Hx Oxygen in Use at Home: No


Hx Sleep Apnea: No


Sleep Apnea Screening Result - Last Documented: Negative


Pulmonary History Comment: Pt with recent h/o bronchitis last week 2/2 viral 

illness.  Completing Z-pack today.  Pt symptoms almost resolved.





- Neurologic History


Hx Cerebrovascular Accident: No


Hx Seizures: No


Hx Dementia: No





- Endocrine History


Hx Diabetes: No





- Renal History


Hx Renal Disorders: Yes


Renal History Comment: hx of chronic uti's.  bladder surgery at AGE 10





- Liver History


Hx Hepatic Disorders: No





- Neurological & Psychiatric Hx


Hx Neurological and Psychiatric Disorders: No





- Cancer History


Hx Cancer: No





- Congenital Disorder History


Hx Congenital Disorders: Yes


Congenital History Comment: bilater hipS





- GI History


Hx Gastrointestinal Disorders: No





- Other Health History


Other Health History: anemic.  wears glasses





- Chronic Pain History


Chronic Pain: Yes (RT HIP)





- Surgical History


Prior Surgeries: RT HIP SCOPE,FEMOROPLASTY,.  LABRAL REPAIR,REMVL HARDWARE.  LT 

HIP 10/30/18.  LT PERIACETABULAR OSTEOTOMY 6/2017.  left hip scope 6/2017.  

Bladder surgery @ 11 yo





ANE Review of Systems


Review of Systems: 








- Exercise capacity


METS (RN): 4 METS





ANE Patient History





- Allergies


Allergies/Adverse Reactions: 








No Known Allergies Allergy (Verified 05/11/17 11:09)


 








- Home Medications


Home Medications: 








Norgestimate-Ethinyl Estradiol [Sprintec 28 Day Tablet] 1 each PO HS 04/28/17 [

Last Taken 11/04/18]


Naproxen BID 11/02/18 [Last Taken 11/03/18]








- NPO status


NPO Since - Liquids (Date): 11/05/18


NPO Since - Liquids (Time): 04:00


NPO Since - Solids (Date): 11/04/18


NPO Since - Solids (Time): 18:00





- Smoking Hx


Smoking Status: Never smoked





- Family Anes Hx


Family Hx Anesthesia Complications: none





ANE Labs/Vital Signs





- Vital Signs


Blood Pressure: 120/90


Heart Rate: 97


Respiratory Rate: 19


O2 Sat (%): 97


Height: 167.64 cm


Weight: 66.678 kg





ANE Physical Exam





- Airway


Mallampati Score: Class 1


Mouth exam: normal dental/mouth exam





- Pulmonary


Pulmonary: no respiratory distress, no rales or rhonchi, clear to auscultation





- Cardiovascular


Cardiovascular: regular rate and rhythym, no murmur, rub, or gallop





- ASA Status


ASA Status: I

## 2018-11-05 NOTE — SUROPNOTE
[facility] Operative Report





- Surgery





Surgery was performed at [facility] on~11/5/18~





Diagnosis:~Right


1.   Hip Acetabular Dysplasia


~


Operation:


Right~Latasha Acetabular Osteotomy (JANINE)





Surgeon: Milind Coates MD


Assistant:~~Nely Serrano MD


Anesthetic: General +~spinal





Procedure:


General anesthetic. Antibiotics given. Cell saver in use. Fluoroscopy.





Phase 1:


Position lateral, diagonal skin incision between ischial tuberosity and greater 

trochanter as for posterior hip approach. Blunt split of glut max fibers. 

Identification of fat pad overlying sciatic nerve. Exposure of sciatic nerve 

under fat pad, gently retracting it away-medially to ischial tuberosity. 

Exposure of subcotoloid fossa proximal to short rotators. Using osteotomes and 

under fluoroscopy, osteotomy of subcotoloid fossa to sciatic notch proximal to 

ischial spine. Closure of lateral cut. Patient is turned supine.





Phase 2:


Skin incision just distal to ASIS. Using diathermy the iliac spine was exposed 

and inguinal ligament + Sartorious were retracted medially, taking the LFCN 

with them, protecting it. Inner ilium was dissected from iliacus muscle bluntly

, with a cob and swab. Dissection continued towards lateral superior ramus 

pubis. Using fluoroscopy an osteotomy of lateral superior ramus, just medial to 

tear drop, was performed with~curved fish mouth osteotome.





Phase 3:


Osteotomy lines of the ilium were marked with diathermy as pre planned 

according to XR/CT and expected correction of acatabulum. 2 Shanz screws were 

drilled into central acetabular fragment, corresponding with planned correction 

angles, in order to mobilize central acetabular fragment after osteotomy is 

complete. ~Iliac osteotomy was performed with reciprocating saw and the main 

acetabular fragment was moved to realign weight bearing position. After 

confirmation of correction using fluoroscopy in AP and false profile planes, 

the fragment was fixed with 2 -~5.5mm~~full threaded~screws~and 1 -~4mm~~full 

threaded~screw.





Inguinal ligament and Sartorious were attached back to ASIS through drill holes.


Incision was closed according to soft tissue layers.


Skin was closed with~subdermal Monocryl.


Final fluoro shots were obtained to confirm position/correction.


After surgery~Lauren~moved both lower limbs and had no NV motor compromise.


Specimen - none


Bleeding -~300ml


Complication - none





Evaluation under anesthesia:


IR at 90 degrees hip flexion prior to JANINE was~30~degrees and after JANINE was 5-10~

degrees.





Bleeding:~300~cc into cell-saver, 135~of blood products were returned to 

patient.





Post op instructions:


1.~Non~weight bearing crutches for 3~weeks


2. Epidural analgesia for 24-48 hours


3. Continuous SCD


4. Aspirin 81 mg X1 day once Epidural is discontinued


5. Avoid hip flexion past 90 and hip External rotation. 


6. PT according to my recommendations at follow up visit





Kind regards,








Dr. Milind Coates


.

## 2018-11-05 NOTE — ASMTCMCOM
CM Note

 

CM Note                       

Notes:

Pt is s/p a R periacetabular osteotomy. She has a hx of hip dysplasia. She lives in [facility] 

with her  and 3 children. Anticipate d/c with no CM needs when medically cleared but will 

continue to follow for any change in needs.



D/C Plan: Anticipate d/c home Independent

 

Date Signed:  11/05/2018 03:25 PM

Electronically Signed By:YAMILETH Fierro

## 2018-11-06 LAB — PLATELET # BLD: 160 10^3/UL (ref 150–400)

## 2018-11-06 RX ADMIN — NAPROXEN SODIUM SCH MG: 220 TABLET, FILM COATED ORAL at 16:58

## 2018-11-06 RX ADMIN — NAPROXEN SODIUM SCH MG: 220 TABLET, FILM COATED ORAL at 07:55

## 2018-11-06 RX ADMIN — POLYETHYLENE GLYCOL 3350 PRN GM: 17 POWDER, FOR SOLUTION ORAL at 07:56

## 2018-11-06 RX ADMIN — DOCUSATE SODIUM AND SENNOSIDES SCH TAB: 50; 8.6 TABLET ORAL at 21:15

## 2018-11-06 RX ADMIN — HYDROMORPHONE HYDROCHLORIDE SCH MG: 2 TABLET ORAL at 21:15

## 2018-11-06 RX ADMIN — HYDROMORPHONE HYDROCHLORIDE SCH MG: 2 TABLET ORAL at 11:56

## 2018-11-06 RX ADMIN — ONDANSETRON PRN MG: 4 TABLET, ORALLY DISINTEGRATING ORAL at 09:54

## 2018-11-06 RX ADMIN — HYDROMORPHONE HYDROCHLORIDE SCH MG: 2 TABLET ORAL at 18:20

## 2018-11-06 RX ADMIN — HYDROMORPHONE HYDROCHLORIDE SCH MG: 2 TABLET ORAL at 08:56

## 2018-11-06 RX ADMIN — DOCUSATE SODIUM AND SENNOSIDES SCH TAB: 50; 8.6 TABLET ORAL at 07:54

## 2018-11-06 RX ADMIN — NAPROXEN SODIUM SCH MG: 220 TABLET, FILM COATED ORAL at 21:14

## 2018-11-06 RX ADMIN — HYDROMORPHONE HYDROCHLORIDE SCH MG: 2 TABLET ORAL at 14:57

## 2018-11-06 RX ADMIN — HYDROMORPHONE HYDROCHLORIDE SCH MG: 2 TABLET ORAL at 05:43

## 2018-11-06 RX ADMIN — HYDROMORPHONE HYDROCHLORIDE SCH MG: 2 TABLET ORAL at 02:59

## 2018-11-06 RX ADMIN — PANTOPRAZOLE SODIUM SCH MG: 40 TABLET, DELAYED RELEASE ORAL at 07:56

## 2018-11-06 RX ADMIN — HYDROMORPHONE HYDROCHLORIDE SCH MG: 2 TABLET ORAL at 23:44

## 2018-11-06 NOTE — SOAPPROG
SOAP Progress Note


Assessment/Plan: 


Assessment:





1 day post op


Right Periacetabular Osteotomy




















Plan:





Dilaudid PO


Up with PT/OT


Pelvis X-ray tomorrow am; if this looks good will discharge her home





11/06/18 20:20





Subjective: 





Lauren is doing very well this evening.  The Nesbitt was discontinued today, she 

was up with PT/OT and nursing staff, going up and down stairs and ambulating 

the halls.  Her pain has been well managed with Dilaudid PO alone, no PCA.  She 

denies any cp no sob or nausea.  She would like to go home tomorrow morning.


Objective: 





 Vital Signs











Temp Pulse Resp BP Pulse Ox


 


 36.8 C   88   16   114/71   92 


 


 11/06/18 20:00  11/06/18 20:00  11/06/18 20:00  11/06/18 20:00  11/06/18 20:00








 Laboratory Results





 11/06/18 05:15 





 11/06/18 05:15 





 











 11/05/18 11/06/18 11/07/18





 05:59 05:59 05:59


 


Intake Total  4700 2440


 


Output Total  5100 350


 


Balance  -400 2090














- Pending Discharge


Pending Discharge Within 24 Hours: Yes


Pending Discharge Date: 11/07/18


Pending Discharge Time: 11:00





ICD10 Worksheet


Patient Problems: 


 Problems











Problem Status Onset


 


Post-op pain Acute

## 2018-11-07 VITALS — SYSTOLIC BLOOD PRESSURE: 103 MMHG | DIASTOLIC BLOOD PRESSURE: 62 MMHG

## 2018-11-07 RX ADMIN — NAPROXEN SODIUM SCH MG: 220 TABLET, FILM COATED ORAL at 08:28

## 2018-11-07 RX ADMIN — HYDROMORPHONE HYDROCHLORIDE SCH MG: 2 TABLET ORAL at 09:11

## 2018-11-07 RX ADMIN — PANTOPRAZOLE SODIUM SCH MG: 40 TABLET, DELAYED RELEASE ORAL at 08:29

## 2018-11-07 RX ADMIN — DOCUSATE SODIUM AND SENNOSIDES SCH TAB: 50; 8.6 TABLET ORAL at 08:28

## 2018-11-07 RX ADMIN — HYDROMORPHONE HYDROCHLORIDE SCH MG: 2 TABLET ORAL at 03:02

## 2018-11-07 RX ADMIN — POLYETHYLENE GLYCOL 3350 PRN GM: 17 POWDER, FOR SOLUTION ORAL at 08:27

## 2018-11-07 RX ADMIN — HYDROMORPHONE HYDROCHLORIDE SCH MG: 2 TABLET ORAL at 05:44

## 2018-11-07 RX ADMIN — HYDROMORPHONE HYDROCHLORIDE SCH MG: 2 TABLET ORAL at 12:16

## 2018-11-07 RX ADMIN — ONDANSETRON PRN MG: 4 TABLET, ORALLY DISINTEGRATING ORAL at 03:45

## 2018-11-07 NOTE — ASMTLACE
LACE

 

Length of stay for            Answers:  3 days                                

current admission                                                             

Acuity / Level of             Answers:  Yes                                   

Care: Did the patient                                                         

have an inpatient                                                             

admission?                                                                    

# of Emergency department     Answers:  0                                     

visits in the last 6                                                          

months                                                                        

Score: 6

 

Date Signed:  11/07/2018 01:38 PM

Electronically Signed By:YAMILETH Champion

## 2018-11-07 NOTE — ASMTCMCOM
CM Note

 

CM Note                       

Notes:

Pt medically stable for d/c with family support, no CM d/c needs identified. 

 

Date Signed:  11/07/2018 01:40 PM

Electronically Signed By:YAMILETH Champion

## 2019-06-10 ENCOUNTER — HOSPITAL ENCOUNTER (OUTPATIENT)
Dept: HOSPITAL 80 - FSGY | Age: 29
Discharge: HOME | End: 2019-06-10
Payer: COMMERCIAL